# Patient Record
(demographics unavailable — no encounter records)

---

## 2025-03-12 NOTE — ASSESSMENT
[FreeTextEntry1] : JORGE STRICKLAND  is a 16 year old male who presents for varicocele, symptomatic with pain and heaviniess US done demonstrated large left varicocele (8mm) and right varicocele (3mm) with decrease and discrepancy in testicular size  We discussed disease process and treatment options for symptomatic varicoceles. The patient understands the scrotal pain may resolve the heaviness that he feels in his scrotum however improving or gaurantteeing his fertility is not 100% guaranteed. There are studies that show there is improvement in semen parameters after treatment of the varicocele.  We discussed the risks and benefits alternatives associated with gonadal embolization regards to nontarget embolization complications of access and migration of coils. We also discussed the success rates of procedures in comparison to surgery.  We had an in depth conversation regarding the benefit of varicocelectomy today.   Surgical risks, including a risk of infection, injury to the testicular artery (extremely rare), injury to the vas deferens and hydrocele were discussed, as was post operative pain and pain control. Post operative skin numbness in the anterior thigh/lateral scrotum were also discussed.  1. Motrin 800 mg po BID start of the procedure 2. Labs C,7 3. Follow up 1 month after procedure 4. 4-6 months US and Semen analysis (69 days for new sperm to mature)  Bilateral varicocele with size discrepancy - 8mm left varicocele, 3 mm right varicocele, discrepancy of of size - testicular size on the lower side but patient still undergoing puberty - cbc, BMP - CT venogram  Thank you very much for allowing me to assist in the care of this patient. Please do not hesitate to contact me with any additional questions or concerns.     Sincerely,     Timothy Cannon D.O. Professor of Urology and Radiology  of Urology at [facility] Director for Prostate Cancer [address] [place] [address], 5th Floor [facility], [facility] Phone: [phone]   I saw and examined/evaluated the patient with the resident ((Dmitri Singh MD (PGY 6)) discussed w/ resident and agree with findings.

## 2025-03-12 NOTE — HISTORY OF PRESENT ILLNESS
[FreeTextEntry1] : Chief Complaint: Varicocele   Date of first visit: 03/11/2025  JORGE STRICKLAND is a 16-year-old male who presents for varicocele, diagnosed by his pediatrician, has pain and heaviness end of the day. US done showed large left varicocele (8mm) and right varicocele (3mm) with decrease in testicular size.   Surgical hx Tonsillectomy  ULT Hx US Scrotum w/ Doppler 12/16/2024 - Testicular size discrepancy with the left testicle smaller than the right. Large left-sided varicocele. Trace bilateral hydrocele.   03/11/2025 IPSS  QOL  LUIZ  EPIC 26   The patient denies fevers, chills, nausea and or vomiting and no unexplained weight loss.  All pertinent laboratory, films and physician notes were reviewed.  Questionnaire results were discussed with patient.

## 2025-03-12 NOTE — ASSESSMENT
[FreeTextEntry1] : JORGE STRICKLAND  is a 16 year old male who presents for varicocele, symptomatic with pain and heaviniess US done demonstrated large left varicocele (8mm) and right varicocele (3mm) with decrease and discrepancy in testicular size  We discussed disease process and treatment options for symptomatic varicoceles. The patient understands the scrotal pain may resolve the heaviness that he feels in his scrotum however improving or gaurantteeing his fertility is not 100% guaranteed. There are studies that show there is improvement in semen parameters after treatment of the varicocele.  We discussed the risks and benefits alternatives associated with gonadal embolization regards to nontarget embolization complications of access and migration of coils. We also discussed the success rates of procedures in comparison to surgery.  We had an in depth conversation regarding the benefit of varicocelectomy today.   Surgical risks, including a risk of infection, injury to the testicular artery (extremely rare), injury to the vas deferens and hydrocele were discussed, as was post operative pain and pain control. Post operative skin numbness in the anterior thigh/lateral scrotum were also discussed.  1. Motrin 800 mg po BID start of the procedure 2. Labs C,7 3. Follow up 1 month after procedure 4. 4-6 months US and Semen analysis (69 days for new sperm to mature)  Bilateral varicocele with size discrepancy - 8mm left varicocele, 3 mm right varicocele, discrepancy of of size - testicular size on the lower side but patient still undergoing puberty - cbc, BMP - CT venogram  Thank you very much for allowing me to assist in the care of this patient. Please do not hesitate to contact me with any additional questions or concerns.     Sincerely,     Timothy Cannon D.O. Professor of Urology and Radiology  of Urology at [facility] Director for Prostate Cancer [address] [place] [address], 5[address] Floor [facility], [facility] Phone: [phone]   I saw and examined/evaluated the patient with the resident ((Dmitri Singh MD (PGY 6)) discussed w/ resident and agree with findings.

## 2025-03-12 NOTE — PHYSICAL EXAM
[General Appearance - Well Developed] : well developed [General Appearance - Well Nourished] : well nourished [Normal Appearance] : normal appearance [Chaperone Present] : A chaperone was present in the examining room during all aspects of the physical examination [de-identified] : left huge varicocele [FreeTextEntry2] : Francisco Rizvi

## 2025-03-12 NOTE — PHYSICAL EXAM
[General Appearance - Well Developed] : well developed [General Appearance - Well Nourished] : well nourished [Normal Appearance] : normal appearance [Chaperone Present] : A chaperone was present in the examining room during all aspects of the physical examination [de-identified] : left huge varicocele [FreeTextEntry2] : Francisco Rizvi

## 2025-03-12 NOTE — HISTORY OF PRESENT ILLNESS
[FreeTextEntry1] : Chief Complaint: Varicocele   Date of first visit: 03/11/2025  JORGE STRICKLAND is a 16-year-old male who presents for varicocele, diagnosed by his pediatrician, has pain and heaviness end of the day. US done showed large left varicocele (8mm) and right varicocele (3mm) with decrease in testicular size.   Surgical hx Tonsillectomy  ULT Hx US Scrotum w/ Doppler 12/16/2024 - Testicular size discrepancy with the left testicle smaller than the right. Large left-sided varicocele. Trace bilateral hydrocele.   03/11/2025 IPSS  QOL  LIUZ  EPIC 26   The patient denies fevers, chills, nausea and or vomiting and no unexplained weight loss.  All pertinent laboratory, films and physician notes were reviewed.  Questionnaire results were discussed with patient.

## 2025-03-12 NOTE — PHYSICAL EXAM
[General Appearance - Well Developed] : well developed [General Appearance - Well Nourished] : well nourished [Normal Appearance] : normal appearance [Chaperone Present] : A chaperone was present in the examining room during all aspects of the physical examination [de-identified] : left huge varicocele [FreeTextEntry2] : Francisco Rizvi

## 2025-06-10 NOTE — HISTORY OF PRESENT ILLNESS
[FreeTextEntry1] : Chief Complaint: Varicocele, s/p Embolization of Left varicocele wit coils and NBCA on 5/6/25 Date of first visit: 03/11/2025  JORGE STRICKLAND is a 16-year-old male who presents for varicocele, diagnosed by his pediatrician, has pain and heaviness end of the day. The US showed a large left varicocele (8mm) and a right varicocele (3mm) with a decrease in testicular size. sp left varicocele embo + coils NBCA 5/6/25- patient doing well.  Reports of heaviness that occasionally occurred and have been resolved.  Surgical hx Tonsillectomy  CT Abdomen and Pelvis 05/04/2025 at [facility] - Left-sided varicocele and dilated left gonadal vein. Otherwise essentially unremarkable CT abdomen pelvis.   ULT Hx US Scrotum w/ Doppler 12/16/2024 - Testicular size discrepancy with the left testicle smaller than the right. Large left-sided varicocele. Trace bilateral hydrocele.  The patient denies fevers, chills, nausea and or vomiting and no unexplained weight loss.  All pertinent laboratory, films and physician notes were reviewed. Questionnaire results were discussed with patient.

## 2025-06-10 NOTE — ASSESSMENT
[FreeTextEntry1] : 16-year-old male who presents for varicocele GIII, large, symptomatic with pain and heaviness. The US has demonstrated a large left varicocele (8mm) and a right varicocele (3mm) with a decrease and discrepancy in testicular size.  The left side embolization was 5/6/25, and he is doing better, Grade II on exam now with some residual veins, but it is still early.  The GIII has been completely resolved.   Bilateralvaricocele with size discrepancy s/p Embolization of Left varicocele wit coils and NBCA on 5/6/25 (There was a pressure gradient between IVC 16 mmHg and left renal vein 19-23 mmHg) consider vascular surgery consult.  However, would be cautious about any surgical interventoin. - 8mm left varicocele, 3 mm right varicocele, discrepancy of of size - testicular size on the lower side but patient still undergoing puberty - Grade 2 on exam 1 month post procedure - symptoms improved - US in 5 months  During   Thank you very much for allowing me to assist in the care of this patient. Please do not hesitate to contact me with any additional questions or concerns.     Sincerely,     Timothy Cannon D.O. Professor of Urology and Radiology  of Urology at [facility] Director for Prostate Cancer [address], [address] [facility], [facility] Phone: [phone]

## 2025-06-10 NOTE — PHYSICAL EXAM
[General Appearance - Well Developed] : well developed [General Appearance - Well Nourished] : well nourished [Normal Appearance] : normal appearance [Urethral Meatus] : meatus normal [Scrotum] : the scrotum was normal [Chaperone Present] : A chaperone was present in the examining room during all aspects of the physical examination [de-identified] : left varicocele improved a lot, grade 2 [FreeTextEntry2] : Francisco Rizvi

## 2025-06-10 NOTE — ASSESSMENT
[FreeTextEntry1] : 16-year-old male who presents for varicocele GIII, large, symptomatic with pain and heaviness. The US has demonstrated a large left varicocele (8mm) and a right varicocele (3mm) with a decrease and discrepancy in testicular size.  The left side embolization was 5/6/25, and he is doing better, Grade II on exam now with some residual veins, but it is still early.  The GIII has been completely resolved.   Bilateralvaricocele with size discrepancy s/p Embolization of Left varicocele wit coils and NBCA on 5/6/25 (There was a pressure gradient between IVC 16 mmHg and left renal vein 19-23 mmHg) consider vascular surgery consult.  However, would be cautious about any surgical interventoin. - 8mm left varicocele, 3 mm right varicocele, discrepancy of of size - testicular size on the lower side but patient still undergoing puberty - Grade 2 on exam 1 month post procedure - symptoms improved - US in 5 months  During   Thank you very much for allowing me to assist in the care of this patient. Please do not hesitate to contact me with any additional questions or concerns.     Sincerely,     Timothy Cannon D.O. Professor of Urology and Radiology  of Urology at [facility] Director for Prostate Cancer [address], [address] Floor [facility], [facility] Phone: [phone]

## 2025-06-10 NOTE — HISTORY OF PRESENT ILLNESS
[FreeTextEntry1] : Chief Complaint: Varicocele, s/p Embolization of Left varicocele wit coils and NBCA on 5/6/25 Date of first visit: 03/11/2025  JORGE STRICKLAND is a 16-year-old male who presents for varicocele, diagnosed by his pediatrician, has pain and heaviness end of the day. The US showed a large left varicocele (8mm) and a right varicocele (3mm) with a decrease in testicular size. sp left varicocele embo + coils NBCA 5/6/25- patient doing well.  Reports of heaviness that occasionally occurred and have been resolved.  Surgical hx Tonsillectomy  CT Abdomen and Pelvis 05/04/2025 at [facility] - Left-sided varicocele and dilated left gonadal vein. Otherwise essentially unremarkable CT abdomen pelvis.   ULT Hx US Scrotum w/ Doppler 12/16/2024 - Testicular size discrepancy with the left testicle smaller than the right. Large left-sided varicocele. Trace bilateral hydrocele.  The patient denies fevers, chills, nausea and or vomiting and no unexplained weight loss.  All pertinent laboratory, films and physician notes were reviewed. Questionnaire results were discussed with patient.

## 2025-06-10 NOTE — PHYSICAL EXAM
[General Appearance - Well Developed] : well developed [General Appearance - Well Nourished] : well nourished [Normal Appearance] : normal appearance [Urethral Meatus] : meatus normal [Scrotum] : the scrotum was normal [Chaperone Present] : A chaperone was present in the examining room during all aspects of the physical examination [de-identified] : left varicocele improved a lot, grade 2 [FreeTextEntry2] : Francisco Rizvi

## 2025-06-10 NOTE — HISTORY OF PRESENT ILLNESS
[FreeTextEntry1] : Chief Complaint: Varicocele, s/p Embolization of Left varicocele wit coils and NBCA on 5/6/25 Date of first visit: 03/11/2025  JORGE STRICKLAND is a 16-year-old male who presents for varicocele, diagnosed by his pediatrician, has pain and heaviness end of the day. The US showed a large left varicocele (8mm) and a right varicocele (3mm) with a decrease in testicular size. sp left varicocele embo + coils NBCA 5/6/25- patient doing well.  Reports of heaviness that occasionally occurred and have been resolved.  Surgical hx Tonsillectomy  CT Abdomen and Pelvis 05/04/2025 at [facility] - Left-sided varicocele and dilated left gonadal vein. Otherwise essentially unremarkable CT abdomen pelvis.   ULT Hx US Scrotum w/ Doppler 12/16/2024 - Testicular size discrepancy with the left testicle smaller than the right. Large left-sided varicocele. Trace bilateral hydrocele.  The patient denies fevers, chills, nausea and or vomiting and no unexplained weight loss.  All pertinent laboratory, films and physician notes were reviewed. Questionnaire results were discussed with patient.

## 2025-06-10 NOTE — PHYSICAL EXAM
[General Appearance - Well Developed] : well developed [General Appearance - Well Nourished] : well nourished [Normal Appearance] : normal appearance [Urethral Meatus] : meatus normal [Scrotum] : the scrotum was normal [Chaperone Present] : A chaperone was present in the examining room during all aspects of the physical examination [de-identified] : left varicocele improved a lot, grade 2 [FreeTextEntry2] : Francisco Rizvi

## 2025-06-10 NOTE — ASSESSMENT
[FreeTextEntry1] : 16-year-old male who presents for varicocele GIII, large, symptomatic with pain and heaviness. The US has demonstrated a large left varicocele (8mm) and a right varicocele (3mm) with a decrease and discrepancy in testicular size.  The left side embolization was 5/6/25, and he is doing better, Grade II on exam now with some residual veins, but it is still early.  The GIII has been completely resolved.   Bilateralvaricocele with size discrepancy s/p Embolization of Left varicocele wit coils and NBCA on 5/6/25 (There was a pressure gradient between IVC 16 mmHg and left renal vein 19-23 mmHg) consider vascular surgery consult.  However, would be cautious about any surgical interventoin. - 8mm left varicocele, 3 mm right varicocele, discrepancy of of size - testicular size on the lower side but patient still undergoing puberty - Grade 2 on exam 1 month post procedure - symptoms improved - US in 5 months  During   Thank you very much for allowing me to assist in the care of this patient. Please do not hesitate to contact me with any additional questions or concerns.     Sincerely,     Timothy Cannon D.O. Professor of Urology and Radiology  of Urology at [facility] Director for Prostate Cancer [address], [address] [facility], [facility] Phone: [phone]